# Patient Record
Sex: FEMALE | Race: BLACK OR AFRICAN AMERICAN | NOT HISPANIC OR LATINO | Employment: UNEMPLOYED | URBAN - METROPOLITAN AREA
[De-identification: names, ages, dates, MRNs, and addresses within clinical notes are randomized per-mention and may not be internally consistent; named-entity substitution may affect disease eponyms.]

---

## 2018-06-25 ENCOUNTER — TELEPHONE (OUTPATIENT)
Dept: PODIATRY | Facility: CLINIC | Age: 83
End: 2018-06-25

## 2018-06-25 NOTE — TELEPHONE ENCOUNTER
----- Message from Poornima Clark sent at 6/25/2018  9:26 AM CDT -----  Contact: Self/ [phone]  Patient Requesting Sooner Appointment.     Reason for sooner appt.: Routine nail care and broken toe.    When is the first available appointment? 7/13/18.   Communication Preference: Home phone [phone]

## 2018-06-25 NOTE — TELEPHONE ENCOUNTER
Spoke with pt  Per pt wants an early day time appt not in the evening  Made aware  We have a different provider  Dr. charles that has early appt  Dr. duarte is booked and for new pt only has 2pm  Per pt no to late  Leave both appt and I will call back to cancel one

## 2020-10-03 ENCOUNTER — HOSPITAL ENCOUNTER (EMERGENCY)
Facility: OTHER | Age: 85
Discharge: HOME | End: 2020-10-03
Attending: EMERGENCY MEDICINE
Payer: COMMERCIAL

## 2020-10-03 VITALS
TEMPERATURE: 99 F | HEART RATE: 65 BPM | RESPIRATION RATE: 17 BRPM | OXYGEN SATURATION: 97 % | DIASTOLIC BLOOD PRESSURE: 78 MMHG | SYSTOLIC BLOOD PRESSURE: 178 MMHG

## 2020-10-03 DIAGNOSIS — R60.0 BILATERAL LEG EDEMA: ICD-10-CM

## 2020-10-03 DIAGNOSIS — I73.9 PVD (PERIPHERAL VASCULAR DISEASE): ICD-10-CM

## 2020-10-03 LAB
ALBUMIN SERPL BCP-MCNC: 3 G/DL (ref 3.5–5.2)
ALP SERPL-CCNC: 61 U/L (ref 55–135)
ALT SERPL W/O P-5'-P-CCNC: 5 U/L (ref 10–44)
ANION GAP SERPL CALC-SCNC: 8 MMOL/L (ref 8–16)
AST SERPL-CCNC: 19 U/L (ref 10–40)
BASOPHILS # BLD AUTO: 0.06 K/UL (ref 0–0.2)
BASOPHILS NFR BLD: 1.3 % (ref 0–1.9)
BILIRUB SERPL-MCNC: 0.3 MG/DL (ref 0.1–1)
BNP SERPL-MCNC: 106 PG/ML (ref 0–99)
BUN SERPL-MCNC: 17 MG/DL (ref 8–23)
CALCIUM SERPL-MCNC: 9.9 MG/DL (ref 8.7–10.5)
CHLORIDE SERPL-SCNC: 103 MMOL/L (ref 95–110)
CO2 SERPL-SCNC: 22 MMOL/L (ref 23–29)
CREAT SERPL-MCNC: 1.5 MG/DL (ref 0.5–1.4)
D DIMER PPP IA.FEU-MCNC: 3.28 MG/L FEU
DIFFERENTIAL METHOD: ABNORMAL
EOSINOPHIL # BLD AUTO: 0.2 K/UL (ref 0–0.5)
EOSINOPHIL NFR BLD: 3.8 % (ref 0–8)
ERYTHROCYTE [DISTWIDTH] IN BLOOD BY AUTOMATED COUNT: 14.6 % (ref 11.5–14.5)
EST. GFR  (AFRICAN AMERICAN): 35 ML/MIN/1.73 M^2
EST. GFR  (NON AFRICAN AMERICAN): 30 ML/MIN/1.73 M^2
GLUCOSE SERPL-MCNC: 90 MG/DL (ref 70–110)
HCT VFR BLD AUTO: 32.1 % (ref 37–48.5)
HGB BLD-MCNC: 10 G/DL (ref 12–16)
IMM GRANULOCYTES # BLD AUTO: 0.01 K/UL (ref 0–0.04)
IMM GRANULOCYTES NFR BLD AUTO: 0.2 % (ref 0–0.5)
INR PPP: 1.1 (ref 0.8–1.2)
LYMPHOCYTES # BLD AUTO: 2 K/UL (ref 1–4.8)
LYMPHOCYTES NFR BLD: 43.7 % (ref 18–48)
MCH RBC QN AUTO: 24.9 PG (ref 27–31)
MCHC RBC AUTO-ENTMCNC: 31.2 G/DL (ref 32–36)
MCV RBC AUTO: 80 FL (ref 82–98)
MONOCYTES # BLD AUTO: 0.5 K/UL (ref 0.3–1)
MONOCYTES NFR BLD: 10.9 % (ref 4–15)
NEUTROPHILS # BLD AUTO: 1.8 K/UL (ref 1.8–7.7)
NEUTROPHILS NFR BLD: 40.1 % (ref 38–73)
NRBC BLD-RTO: 0 /100 WBC
PLATELET # BLD AUTO: 260 K/UL (ref 150–350)
PMV BLD AUTO: 10.1 FL (ref 9.2–12.9)
POTASSIUM SERPL-SCNC: 4.3 MMOL/L (ref 3.5–5.1)
PROT SERPL-MCNC: 7.2 G/DL (ref 6–8.4)
PROTHROMBIN TIME: 11.4 SEC (ref 9–12.5)
RBC # BLD AUTO: 4.01 M/UL (ref 4–5.4)
SODIUM SERPL-SCNC: 133 MMOL/L (ref 136–145)
WBC # BLD AUTO: 4.49 K/UL (ref 3.9–12.7)

## 2020-10-03 PROCEDURE — 85025 COMPLETE CBC W/AUTO DIFF WBC: CPT

## 2020-10-03 PROCEDURE — 80053 COMPREHEN METABOLIC PANEL: CPT

## 2020-10-03 PROCEDURE — 96374 THER/PROPH/DIAG INJ IV PUSH: CPT

## 2020-10-03 PROCEDURE — 99284 EMERGENCY DEPT VISIT MOD MDM: CPT | Mod: 25

## 2020-10-03 PROCEDURE — 83880 ASSAY OF NATRIURETIC PEPTIDE: CPT

## 2020-10-03 PROCEDURE — 85379 FIBRIN DEGRADATION QUANT: CPT

## 2020-10-03 PROCEDURE — 85610 PROTHROMBIN TIME: CPT

## 2020-10-03 PROCEDURE — 63600175 PHARM REV CODE 636 W HCPCS: Performed by: EMERGENCY MEDICINE

## 2020-10-03 PROCEDURE — 25000003 PHARM REV CODE 250: Performed by: EMERGENCY MEDICINE

## 2020-10-03 RX ORDER — ACETAMINOPHEN 500 MG
1000 TABLET ORAL
Status: COMPLETED | OUTPATIENT
Start: 2020-10-03 | End: 2020-10-03

## 2020-10-03 RX ORDER — HYDROCHLOROTHIAZIDE 12.5 MG/1
TABLET ORAL
COMMUNITY
Start: 2020-09-17

## 2020-10-03 RX ORDER — LOSARTAN POTASSIUM 50 MG/1
100 TABLET ORAL
Status: COMPLETED | OUTPATIENT
Start: 2020-10-03 | End: 2020-10-03

## 2020-10-03 RX ORDER — KETOROLAC TROMETHAMINE 30 MG/ML
15 INJECTION, SOLUTION INTRAMUSCULAR; INTRAVENOUS
Status: COMPLETED | OUTPATIENT
Start: 2020-10-03 | End: 2020-10-03

## 2020-10-03 RX ADMIN — KETOROLAC TROMETHAMINE 15 MG: 30 INJECTION, SOLUTION INTRAMUSCULAR at 06:10

## 2020-10-03 RX ADMIN — LOSARTAN POTASSIUM 100 MG: 50 TABLET ORAL at 05:10

## 2020-10-03 RX ADMIN — ACETAMINOPHEN 1000 MG: 500 TABLET, FILM COATED ORAL at 06:10

## 2020-10-03 NOTE — ED TRIAGE NOTES
"Pt presents to ED via EMS with c/o worsening BLE swelling and pain x2 months. Reports the swelling as a chronic issue states "it's never this swollen and hard". Pt states the pain is the worst in her heels. Denies fever, SOB, chest pain, abdominal pain, N/V/D, dysuria, and numbness/tingling  "

## 2020-10-03 NOTE — ED PROVIDER NOTES
Encounter Date: 10/3/2020    SCRIBE #1 NOTE: I, Paul Vick, am scribing for, and in the presence of, Dr. Chapman.       History     Chief Complaint   Patient presents with    Leg Pain     BLE; chronic     Time seen by provider: 2:56 AM    This is a 89 y.o. female with a history of HTN who presents with complaint of bilateral leg pain. Patient was brought in via EMS who reported that her legs have been swelling for the past 2 months, and that in the last month they are getting more swollen and painful. Patient reports that she went to her PCP who gave her a cream for her legs, but tonight the pain in her heels is unbearable. Patient has been wheelchair bound for the last 6 months, and states that her two daughters help her with daily responsibilities. Patient reports that she takes tylenol and aspirin for pain relief. Patient reports that she went to get her toe nails clipped, and claims that when she got her nails clipped, that is when her pain began. Other than the incident with her toe nails, patient was unable to report a definite beginning of when her leg swelling began or why. Patient denies any chest pain, SOB, cough, and fever.    The history is provided by the patient and the EMS personnel.     Review of patient's allergies indicates:   Allergen Reactions    Elncho-synephrine  [phenylephrine hcl]      Other reaction(s): Eye irritation    Sulfa (sulfonamide antibiotics) Hives     Other reaction(s): Hallucinations  Other reaction(s): Difficulty breathing    Tropicamide      Other reaction(s): Eye irritation     Past Medical History:   Diagnosis Date    Arthritis     Breast cancer     Cataract     Eye pain 6/11/12    High cholesterol     Hypertension     Slow heart rate      Past Surgical History:   Procedure Laterality Date    BREAST LUMPECTOMY      lymphectomy       Family History   Problem Relation Age of Onset    Cancer Mother     Hypertension Mother     Cataracts Father      Hypertension Father      Social History     Tobacco Use    Smoking status: Never Smoker   Substance Use Topics    Alcohol use: No    Drug use: Not on file     Review of Systems   Constitutional: Negative for fever.   HENT: Negative for sore throat.    Respiratory: Negative for cough and shortness of breath.    Cardiovascular: Positive for leg swelling. Negative for chest pain.   Gastrointestinal: Negative for nausea.   Genitourinary: Negative for dysuria.   Musculoskeletal: Positive for arthralgias. Negative for back pain.   Skin: Negative for rash.   Neurological: Negative for weakness.   Hematological: Does not bruise/bleed easily.       Physical Exam     Initial Vitals [10/03/20 0238]   BP Pulse Resp Temp SpO2   (!) 198/79 80 17 98.8 °F (37.1 °C) 98 %      MAP       --         Physical Exam    Nursing note and vitals reviewed.  Constitutional: She is not diaphoretic. No distress.   Chronically ill appearing.    HENT:   Head: Normocephalic and atraumatic.   Eyes: EOM are normal. Pupils are equal, round, and reactive to light.   Neck: Normal range of motion. Neck supple.   Cardiovascular: Normal rate, regular rhythm, normal heart sounds and intact distal pulses.   Pulmonary/Chest: Breath sounds normal. No respiratory distress. She has no wheezes. She has no rhonchi. She has no rales.   Abdominal: Soft. There is no abdominal tenderness. There is no rebound and no guarding.   Musculoskeletal: Tenderness and edema present.      Comments: 2+ BLE edema below knees. DP pulses present via doppler.  No ulcers or necrosis. Knees slightly contracted.    Neurological: She is alert and oriented to person, place, and time.   Skin: Skin is warm and dry. No rash noted.   Psychiatric: She has a normal mood and affect. Her behavior is normal. Judgment and thought content normal.         ED Course   Procedures  Labs Reviewed   CBC W/ AUTO DIFFERENTIAL - Abnormal; Notable for the following components:       Result Value     Hemoglobin 10.0 (*)     Hematocrit 32.1 (*)     Mean Corpuscular Volume 80 (*)     Mean Corpuscular Hemoglobin 24.9 (*)     Mean Corpuscular Hemoglobin Conc 31.2 (*)     RDW 14.6 (*)     All other components within normal limits   COMPREHENSIVE METABOLIC PANEL - Abnormal; Notable for the following components:    Sodium 133 (*)     CO2 22 (*)     Creatinine 1.5 (*)     Albumin 3.0 (*)     ALT 5 (*)     eGFR if  35 (*)     eGFR if non  30 (*)     All other components within normal limits   B-TYPE NATRIURETIC PEPTIDE - Abnormal; Notable for the following components:     (*)     All other components within normal limits   D DIMER, QUANTITATIVE - Abnormal; Notable for the following components:    D-Dimer 3.28 (*)     All other components within normal limits   PROTIME-INR          Imaging Results           US Lower Extremity Arteries Bilateral (Final result)  Result time 10/03/20 06:20:21    Final result by Kevan Mata MD (10/03/20 06:20:21)                 Impression:      Abnormal monophasic waveforms throughout the bilateral lower extremities suggestive of upstream stenosis/peripheral arterial disease.    Hemodynamically significant stenosis in the right peroneal artery.  No focal occlusion identified.    This report was flagged in Epic as abnormal.      Electronically signed by: Kevan Mata MD  Date:    10/03/2020  Time:    06:20             Narrative:    EXAMINATION:  US LOWER EXTREMITY ARTERIES BILATERAL    CLINICAL HISTORY:  Peripheral vascular disease, unspecified    TECHNIQUE:  Bilateral spectral, color and grayscale images of the large arteries of both lower extremities were performed.    COMPARISON:  None    FINDINGS:  Right lower extremity.    CFA: 241 cm/s, monophasic    Prox SFA: 170 cm/s, monophasic    Mid SFA: 141 cm/s, monophasic    Dist SFA: 147 cm/s, monophasic    Pop A: 46 cm/s, monophasic    Per A: 137 cm/s, monophasic    PTA: 62 cm/s,  monophasic    NEETU: 58 cm/s, monophasic    DPA: 42 cm/s, monophasic    Left lower extremity    CFA: 179 cm/s, monophasic    Prox SFA: 198 cm/s, monophasic    Mid SFA: 114 cm/s, monophasic    Dist SFA: 112 cm/s, monophasic    Pop A: 92 cm/s, monophasic    Per A: 113 cm/s, monophasic    PTA: 58 cm/s, monophasic    NEETU: 37 cm/s, monophasic    DPA: 20 cm/s, monophasic    Bilateral lower extremity edema.  Suspect small collateral vessels in the right lower extremity.                                 US Lower Extremity Veins Bilateral (Final result)  Result time 10/03/20 05:26:28    Final result by Martha Rodriguez MD (10/03/20 05:26:28)                 Impression:      No evidence of deep venous thrombosis in either lower extremity.      Electronically signed by: Martha Rodriguez MD  Date:    10/03/2020  Time:    05:26             Narrative:    EXAMINATION:  US LOWER EXTREMITY VEINS BILATERAL    CLINICAL HISTORY:  Localized edema    TECHNIQUE:  Duplex and color flow Doppler and dynamic compression was performed of the bilateral lower extremity veins was performed.    COMPARISON:  None    FINDINGS:  Right thigh veins: The common femoral, femoral, popliteal, upper greater saphenous, and deep femoral veins are patent and free of thrombus. The veins are normally compressible and have normal phasic flow and augmentation response.    Right calf veins: The visualized calf veins are patent.    Left thigh veins: The common femoral, femoral, popliteal, upper greater saphenous, and deep femoral veins are patent and free of thrombus. The veins are normally compressible and have normal phasic flow and augmentation response.    Left calf veins: The visualized calf veins are patent.    Miscellaneous: Bilateral lower extremity edema is present.                                 Medical Decision Making:   History:   Old Medical Records: I decided to obtain old medical records.  Initial Assessment:       89-year-old female with history of  hypertension brought by EMS for evaluation of bilateral lower leg swelling and pain which started about 2 months ago and worse now.  Patient is somewhat difficult historian, but states that she has been seeing her PCP and cardiologist for this edema but has been only been given a cream which has not relieved the pain.  She states pain is now worse in her heels.  She has not ambulated for the past 6 months, and admits to spending a lot of time in her wheelchair.  She denies any chest pain, SOB, fevers, or other associated symptoms.  Her daughter's help take care for at home along with home health aide, but they did not come with EMS and no collateral information available for H&P.  On exam patient with diffuse 2+ edema and minimal erythema to BLE below knees.  No ulcers or signs of arterial insufficiency appreciated, no clear etiology for pain worse at heel.  She does have 2+ DP pulses present by Doppler.      Per chart review, patient had outpatient cardiology visit and ultrasound over week ago that showed no evidence of DVT, and had abnormal ABIs showing some degree of PAD bilaterally.  No sign of acute arterial occlusion, and no sign of cellulitis at this time.  I suspect her swelling may be dependent edema related to being wheelchair-bound.  No SOB, bibasilar rales, hypoxia, or other signs of pulmonary edema to suggest CHF.  Unclear whether patient is on diuretics or not.    Basic labs checked with hemoglobin 10 and CR 1.5 with no previous baseline, minimally elevated BNP, and elevated D-dimer.  Given elevated D-dimer, ultrasound repeated with still no evidence of DVT.  Given reports of abnormal ABIs, arterial ultrasound also done that showed abnormal monophasic waveforms throughout BLE is, suggestive of upstream stenosis/PAD, with hemodynamically significant stenosis in the right peroneal artery though no focal occlusion.  Since no sign of acute arterial insufficiency or focal occlusion, no indication for  emergent intervention at this time.  I was able to get in touch with patient's daughter Delmis Manning who is 1 of her caretakers.  She states patient's PCP is at [facility] and her cardiologist is at [facility], and they are working up her edema.  She states patient has refused to go to her bed and has been staying in her wheelchair for months now, leading to worsening edema.  They got her bed back last night but patient had pain since they were unable to position her legs to help for pain related to knee contractures, so she called EMS while only her home aide was there.   I discussed with family likely PE ED, and did not want any further intervention at this time. They will be given copies of workup here to take to their PCP and cardiologist for further intervention as indicated, understand to return to ED for any worsening pain, signs of necrosis or insufficiency, or signs of cellulitis.      Clinical Tests:   Lab Tests: Ordered and Reviewed  Radiological Study: Ordered and Reviewed            Scribe Attestation:   Scribe #1: I performed the above scribed service and the documentation accurately describes the services I performed. I attest to the accuracy of the note.    Attending Attestation:           Physician Attestation for Scribe:  Physician Attestation Statement for Scribe #1: I, Dr. Chapman, reviewed documentation, as scribed by Paul Vick in my presence, and it is both accurate and complete.                           Clinical Impression:       ICD-10-CM ICD-9-CM   1. Bilateral leg edema  R60.0 782.3   2. PVD (peripheral vascular disease)  I73.9 443.9                          ED Disposition Condition    Discharge Stable        ED Prescriptions     None        Follow-up Information     Follow up With Specialties Details Why Contact Info    Chandan Mckay MD Family Medicine Schedule an appointment as soon as possible for a visit in 3 days  [address]   [address]  [facility] [id number] [phone]                                          Blade Chapman MD  10/03/20 0734

## 2020-10-13 ENCOUNTER — HOSPITAL ENCOUNTER (EMERGENCY)
Facility: OTHER | Age: 85
Discharge: HOME | End: 2020-10-14
Attending: EMERGENCY MEDICINE
Payer: COMMERCIAL

## 2020-10-13 DIAGNOSIS — M79.606 CHRONIC PAIN OF LOWER EXTREMITY, UNSPECIFIED LATERALITY: Primary | ICD-10-CM

## 2020-10-13 DIAGNOSIS — G89.29 CHRONIC PAIN OF LOWER EXTREMITY, UNSPECIFIED LATERALITY: Primary | ICD-10-CM

## 2020-10-13 DIAGNOSIS — M79.652 LEFT THIGH PAIN: ICD-10-CM

## 2020-10-13 LAB
BACTERIA #/AREA URNS HPF: ABNORMAL /HPF
BILIRUB UR QL STRIP: NEGATIVE
CLARITY UR: CLEAR
COLOR UR: YELLOW
GLUCOSE UR QL STRIP: NEGATIVE
HGB UR QL STRIP: ABNORMAL
KETONES UR QL STRIP: NEGATIVE
LEUKOCYTE ESTERASE UR QL STRIP: ABNORMAL
MICROSCOPIC COMMENT: ABNORMAL
NITRITE UR QL STRIP: NEGATIVE
NON-SQ EPI CELLS #/AREA URNS HPF: 5 /HPF
PH UR STRIP: 7 [PH] (ref 5–8)
PROT UR QL STRIP: NEGATIVE
RBC #/AREA URNS HPF: 1 /HPF (ref 0–4)
SP GR UR STRIP: 1.01 (ref 1–1.03)
SQUAMOUS #/AREA URNS HPF: 2 /HPF
URN SPEC COLLECT METH UR: ABNORMAL
UROBILINOGEN UR STRIP-ACNC: NEGATIVE EU/DL
WBC #/AREA URNS HPF: 3 /HPF (ref 0–5)
YEAST URNS QL MICRO: ABNORMAL

## 2020-10-13 PROCEDURE — 25000003 PHARM REV CODE 250: Performed by: EMERGENCY MEDICINE

## 2020-10-13 PROCEDURE — 81000 URINALYSIS NONAUTO W/SCOPE: CPT

## 2020-10-13 PROCEDURE — 99284 EMERGENCY DEPT VISIT MOD MDM: CPT | Mod: 25

## 2020-10-13 RX ORDER — IBUPROFEN 400 MG/1
800 TABLET ORAL
Status: COMPLETED | OUTPATIENT
Start: 2020-10-13 | End: 2020-10-13

## 2020-10-13 RX ORDER — ACETAMINOPHEN 500 MG
1000 TABLET ORAL
Status: COMPLETED | OUTPATIENT
Start: 2020-10-13 | End: 2020-10-13

## 2020-10-13 RX ADMIN — IBUPROFEN 800 MG: 400 TABLET, FILM COATED ORAL at 10:10

## 2020-10-13 RX ADMIN — ACETAMINOPHEN 1000 MG: 500 TABLET, FILM COATED ORAL at 10:10

## 2020-10-14 VITALS
OXYGEN SATURATION: 98 % | RESPIRATION RATE: 16 BRPM | TEMPERATURE: 98 F | DIASTOLIC BLOOD PRESSURE: 87 MMHG | HEART RATE: 65 BPM | SYSTOLIC BLOOD PRESSURE: 215 MMHG | WEIGHT: 250 LBS

## 2020-10-14 NOTE — ED NOTES
Melany West ([phone]), daughter, notified of patient discharge and plan for [facility] to arrange transport via [facility]. Ms West asks that we call her when patient leaves ED.

## 2020-10-14 NOTE — ED TRIAGE NOTES
"Pt reports to ED via EMS with c/o hip and leg pain . Pt states that she has been "sitting in a wheelchair that has been pressing against my leg and making me suffer."  Pt states this pain has "been going on a long while", describes pain as "rambunctious." Pt reports that pain is worse on L than R.  "

## 2020-10-14 NOTE — ED PROVIDER NOTES
Encounter Date: 10/13/2020    SCRIBE #1 NOTE: Lorteta SANTAMARIA, am scribing for, and in the presence of, Dr. Herron.       History     Chief Complaint   Patient presents with    Hip Pain     chronic hip and leg pain no trauma or injury     Time seen by provider: 10:21 PM    This is a 89 y.o. female, with a hx of arthritis, who presents via EMS with complaint of chronic bilateral leg pain. Pt states her legs have been hurting for the last 4 months. She states the current pain is the same pain she has experienced previously, but it is more intense. Pt states she recently fell out of her wheelchair, but she is unsure of which day. Pt reports dysuria and urinary incontinence. She denies back pain or head trauma. She states she lives alone and does not have home health services.    The history is provided by the patient, medical records and a relative.     Review of patient's allergies indicates:   Allergen Reactions    Lencho-synephrine  [phenylephrine hcl]      Other reaction(s): Eye irritation    Sulfa (sulfonamide antibiotics) Hives     Other reaction(s): Hallucinations  Other reaction(s): Difficulty breathing    Tropicamide      Other reaction(s): Eye irritation     Past Medical History:   Diagnosis Date    Arthritis     Breast cancer     Cataract     Eye pain 6/11/12    High cholesterol     Hypertension     Slow heart rate      Past Surgical History:   Procedure Laterality Date    BREAST LUMPECTOMY      lymphectomy       Family History   Problem Relation Age of Onset    Cancer Mother     Hypertension Mother     Cataracts Father     Hypertension Father      Social History     Tobacco Use    Smoking status: Never Smoker   Substance Use Topics    Alcohol use: No    Drug use: Not Currently     Review of Systems   Constitutional: Negative for chills and fever.   HENT: Negative for congestion, rhinorrhea and sore throat.    Eyes: Negative for visual disturbance.   Respiratory: Negative for cough and  shortness of breath.    Cardiovascular: Negative for chest pain.   Gastrointestinal: Negative for abdominal pain, nausea and vomiting.   Genitourinary: Positive for dysuria.   Musculoskeletal: Negative for back pain.        Positive leg pain.   Skin: Negative for rash.   Neurological: Negative for dizziness, weakness, light-headedness and headaches.   Psychiatric/Behavioral: Negative for confusion.       Physical Exam     Initial Vitals [10/13/20 2131]   BP Pulse Resp Temp SpO2   (!) 200/81 (!) 59 18 96.1 °F (35.6 °C) 100 %      MAP       --         Physical Exam    Nursing note and vitals reviewed.  Constitutional: She appears well-developed and well-nourished. She is not diaphoretic. No distress.   HENT:   Head: Normocephalic and atraumatic.   Eyes: Conjunctivae and EOM are normal. Pupils are equal, round, and reactive to light. No scleral icterus.   Neck: Normal range of motion. Neck supple.   Cardiovascular: Normal rate, regular rhythm and normal heart sounds. Exam reveals no gallop and no friction rub.    No murmur heard.  Pulmonary/Chest: Breath sounds normal. No respiratory distress. She has no wheezes. She has no rhonchi. She has no rales.   Musculoskeletal: Normal range of motion. Tenderness and edema present.      Comments: Diffuse bilateral lower extremity tenderness. 1+ pitting edema below knees. Knees contracted.   Neurological: She is alert and oriented to person, place, and time.   Skin: Skin is warm and dry.   No erythema or warmth.         ED Course   Procedures  Labs Reviewed   URINALYSIS, REFLEX TO URINE CULTURE - Abnormal; Notable for the following components:       Result Value    Occult Blood UA Trace (*)     Leukocytes, UA Trace (*)     All other components within normal limits    Narrative:     Specimen Source->Urine   URINALYSIS MICROSCOPIC - Abnormal; Notable for the following components:    Yeast, UA Rare (*)     Non-Squam Epith 5 (*)     All other components within normal limits     Narrative:     Specimen Source->Urine          Imaging Results          X-Ray Femur Ap/Lat Left (Final result)  Result time 10/13/20 23:15:45    Final result by Bj Rodriguez MD (10/13/20 23:15:45)                 Impression:      No radiographic evidence of acute displaced fracture or dislocation.  Further evaluation and follow-up as clinically warranted.      Electronically signed by: Bj Rodriguez MD  Date:    10/13/2020  Time:    23:15             Narrative:    EXAMINATION:  XR FEMUR 2 VIEW LEFT    CLINICAL HISTORY:  Pain in left thigh    TECHNIQUE:  AP and lateral views of the left femur were performed.    COMPARISON:  None    FINDINGS:  There is diffuse osteopenia.  No radiographic evidence of acute displaced fracture.  The left femoral head appears appropriately seated within the acetabulum with moderate osteoarthrosis.  Evaluation of the left knee is limited secondary to patient positioning.  A few vascular calcifications are noted in the soft tissues.                                 Medical Decision Making:   History:   Old Medical Records: I decided to obtain old medical records.  Initial Assessment:   89-year-old female brought in by EMS secondary to bilateral leg pain greatest in the left thigh.  She did report having a fall while trying to transfer out of her wheelchair tonight.  No obvious deformity on exam.  Patient diffusely tender to palpation of bilateral lower extremities.  Clinical Tests:   Lab Tests: Ordered and Reviewed  Radiological Study: Ordered and Reviewed  ED Management:  I did review the patient's medical records and she had a recent ED visit with a similar presentation.  During that visit she had both venous and arterial ultrasounds of bilateral lower extremities.  Patient does have a known history of CAD.  She is under the care of cardiology and primary care already for this.  I did speak with the patient's daughter, Poli, who stated that this is a known chronic problem.  Her  other daughter also spoke with EMS and the patient's nurse and stated that they were unsure why she had called EMS tonight.  She also stressed to them that this is a known problem.  In light of this I do not feel any further workup is necessary beyond the femur x-ray that was obtained upon initial assessment.  X-ray showed no evidence of acute process.  Patient received Tylenol Motrin in the emergency department and was transferred home in stable condition.            Scribe Attestation:   Scribe #1: I performed the above scribed service and the documentation accurately describes the services I performed. I attest to the accuracy of the note.    Attending Attestation:           Physician Attestation for Scribe:  Physician Attestation Statement for Scribe #1: I, Dr. Herron, reviewed documentation, as scribed by Loretta Simpson in my presence, and it is both accurate and complete.                           Clinical Impression:       ICD-10-CM ICD-9-CM   1. Chronic pain of lower extremity, unspecified laterality  M79.606 729.5    G89.29 338.29   2. Left thigh pain  M79.652 729.5                          ED Disposition Condition    Discharge Stable        ED Prescriptions     None        Follow-up Information     Follow up With Specialties Details Why Contact Info    Your primary care doctor  Schedule an appointment as soon as possible for a visit  As needed     [facility] Go to  If symptoms worsen [address] [id number]  [phone]                                       Patsy Herron MD  10/14/20 0229

## 2023-04-27 ENCOUNTER — HOSPITAL ENCOUNTER (OUTPATIENT)
Dept: RADIOLOGY | Facility: OTHER | Age: 88
Discharge: HOME | End: 2023-04-27
Attending: NURSE PRACTITIONER
Payer: COMMERCIAL

## 2023-04-27 DIAGNOSIS — F03.90 UNSPECIFIED DEMENTIA, UNSPECIFIED SEVERITY, WITHOUT BEHAVIORAL DISTURBANCE, PSYCHOTIC DISTURBANCE, MOOD DISTURBANCE, AND ANXIETY: ICD-10-CM

## 2023-04-27 PROCEDURE — 70551 MRI BRAIN STEM W/O DYE: CPT | Mod: TC

## 2023-04-27 PROCEDURE — 70551 MRI BRAIN STEM W/O DYE: CPT | Mod: 26,,, | Performed by: RADIOLOGY

## 2023-04-27 PROCEDURE — 70551 MRI BRAIN WITHOUT CONTRAST: ICD-10-PCS | Mod: 26,,, | Performed by: RADIOLOGY
